# Patient Record
Sex: MALE | Race: WHITE | NOT HISPANIC OR LATINO | Employment: FULL TIME | URBAN - METROPOLITAN AREA
[De-identification: names, ages, dates, MRNs, and addresses within clinical notes are randomized per-mention and may not be internally consistent; named-entity substitution may affect disease eponyms.]

---

## 2017-07-31 ENCOUNTER — APPOINTMENT (OUTPATIENT)
Dept: OCCUPATIONAL MEDICINE | Facility: CLINIC | Age: 27
End: 2017-07-31

## 2017-07-31 PROCEDURE — 99000 SPECIMEN HANDLING OFFICE-LAB: CPT | Performed by: PHYSICIAN ASSISTANT

## 2022-06-28 ENCOUNTER — OFFICE VISIT (OUTPATIENT)
Dept: FAMILY MEDICINE | Facility: CLINIC | Age: 32
End: 2022-06-28
Payer: COMMERCIAL

## 2022-06-28 VITALS
OXYGEN SATURATION: 98 % | HEART RATE: 62 BPM | TEMPERATURE: 98.1 F | SYSTOLIC BLOOD PRESSURE: 134 MMHG | BODY MASS INDEX: 26.46 KG/M2 | DIASTOLIC BLOOD PRESSURE: 76 MMHG | RESPIRATION RATE: 12 BRPM | WEIGHT: 174.6 LBS | HEIGHT: 68 IN

## 2022-06-28 DIAGNOSIS — Z11.4 SCREENING FOR HIV (HUMAN IMMUNODEFICIENCY VIRUS): ICD-10-CM

## 2022-06-28 DIAGNOSIS — S06.0X1A CONCUSSION WITH LOSS OF CONSCIOUSNESS OF 30 MINUTES OR LESS, INITIAL ENCOUNTER: ICD-10-CM

## 2022-06-28 DIAGNOSIS — Z11.59 NEED FOR HEPATITIS C SCREENING TEST: ICD-10-CM

## 2022-06-28 DIAGNOSIS — G40.909 SEIZURE DISORDER (H): Primary | ICD-10-CM

## 2022-06-28 PROCEDURE — 99204 OFFICE O/P NEW MOD 45 MIN: CPT | Performed by: FAMILY MEDICINE

## 2022-06-28 RX ORDER — LEVETIRACETAM 500 MG/1
500 TABLET ORAL
COMMUNITY

## 2022-06-28 ASSESSMENT — PAIN SCALES - GENERAL: PAINLEVEL: MILD PAIN (3)

## 2022-06-28 NOTE — PROGRESS NOTES
Assessment & Plan     Seizure disorder (H)  No recurrence since restarting Keppra.  Advised long term management of seizures.  Patient will see neurology to establish care.  Advised no driving for at least 90 days due to recent recurrence of seizure.  Advised not to consume alcohol while on levetiracetam as he reported feeling easily drunk when he drank alcohol the first time he took Keppra last year.  Reasons to go to ER discussed in detail with patient.  - Adult Neurology  Referral    Screening for HIV (human immunodeficiency virus)  Patient will address on wellness visit.    Need for hepatitis C screening test  Patient will address on wellness visit.    Concussion with loss of consciousness of 30 minutes or less, initial encounter  No red flags but patient has persistent right sided headache and states has been reported to be forgetful.  Referred to concussion clinic for longer term surveillance.  - Concussion  Referral    Patient Instructions   Referrals to neurology and concussion clinic have been signed. Schedulers will call you in the next 3-5 business days.     Continue keppra as prescribed.    No driving until further advised by your neurologist.    Do not consume alcohol since you are taking levetiracetam.    Talk to your neurologist about getting the tetanus vaccine later this year.    You may have your hepatitis C and HIV screening completed on your wellness exam.          Return in about 1 month (around 7/28/2022) for In-clinic visit for wellness exam.    Jordan Herrera MD  [facility]    Tone Cole is a 31 year old, presenting for the following health issues:  Seizures (History of epilepsy, seizure on Friday, was seen at [facility].  Had been off of his meds and doing well. ) and Imm/Inj (Update tetanus)      HPI     Chief Complaint   Patient presents with     Seizures     History of epilepsy, seizure on Friday, was seen at [facility].   "Had been off of his meds and doing well.      Imm/Inj     Update tetanus       Has had seizure in October 2021. Was placed on levetiracetam.  Patient never had regular follow ups with neurology.  He stopped the med Jan 2022 due to not feeling good with it.    Patient said the medication made him feel easy to get drunk.      ED/UC Followup:    Facility:  [facility]  Date of visit: 6/24/22  Reason for visit: seizure  Current Status: Continues to have headache.  Slight confusion.  Restarted his medication on Friday, Keppra.  Right ear has been plugged since the seizure.    Patient still c/o right sided headache since the seizure and hitting his head in the process.   Denies confusion, or behavior changes.  May be a little more forgetful than his usual self.    Patient denies recurrence of seizure since 6/24/2022.    Review of Systems   Constitutional, HEENT, cardiovascular, pulmonary, GI, , musculoskeletal, neuro, skin, endocrine and psych systems are negative, except as otherwise noted.      Objective    /76 (BP Location: Left arm, Patient Position: Chair, Cuff Size: Adult Large)   Pulse 62   Temp 98.1  F (36.7  C) (Tympanic)   Resp 12   Ht 1.715 m (5' 7.5\")   Wt 79.2 kg (174 lb 9.6 oz)   SpO2 98%   BMI 26.94 kg/m    Body mass index is 26.94 kg/m .  Physical Exam   GENERAL: overweight, alert and no distress, ambulatory w/o assist; gait normal  EYES: PEBRTL, EOMI, no icterus  HENT: atraumatic; non-tender on palpation, EAM with impacted cerumen bilaterally, nose clear; throat clear  NECK: no tenderness, no adenopathy,  Thyroid not enlarged  RESP: lungs clear to auscultation - no rales, no rhonchi, no wheezes  CV: regular rates and rhythm, no murmur  MS: no edema; full range of motion x 4   SKIN: no suspicious lesions, no rashes  NEURO: Patient is A and O X 3; Cranial nerves 2-12 intact;  Strength 5/5 all extremities; DTR: ++ x 4; Sensory no gross deficit; no tremors No problems with motor " coordination      Office Visit on 08/28/2008   Component Date Value Ref Range Status     WBC 08/28/2008 6.7  4.0 - 11.0 10e9/L Final     RBC Count 08/28/2008 5.23  4.4 - 5.9 10e12/L Final     Hemoglobin 08/28/2008 15.0  13.3 - 17.7 g/dL Final     Hematocrit 08/28/2008 42.7  40.0 - 53.0 % Final     MCV 08/28/2008 82  78 - 100 fl Final     MCH 08/28/2008 28.7  26.5 - 33.0 pg Final     MCHC 08/28/2008 35.1  31.5 - 36.5 g/dL Final     RDW 08/28/2008 12.6  10.0 - 15.0 % Final     Platelet Count 08/28/2008 297  150 - 450 10e9/L Final     Diff Method 08/28/2008 Automated Method   Final     % Neutrophils 08/28/2008 63  40 - 75 % Final     % Lymphocytes 08/28/2008 27  20 - 48 % Final     % Monocytes 08/28/2008 6  0 - 12 % Final     % Eosinophils 08/28/2008 4  0 - 6 % Final     % Basophils 08/28/2008 0  0 - 2 % Final     Absolute Neutrophil 08/28/2008 4.2  1.6 - 8.3 10e9/L Final     Absolute Lymphocytes 08/28/2008 1.8  0.8 - 5.3 10e9/L Final     Absolute Monocytes 08/28/2008 0.4  0.0 - 1.3 10e9/L Final     Absolute Eosinophils 08/28/2008 0.3  0.0 - 0.7 10e9/L Final     Absolute Basophils 08/28/2008 0.0  0.0 - 0.2 10e9/L Final               .  ..

## 2022-06-28 NOTE — PATIENT INSTRUCTIONS
Referrals to neurology and concussion clinic have been signed. Schedulers will call you in the next 3-5 business days.     Continue keppra as prescribed.    No driving until further advised by your neurologist.    Do not consume alcohol since you are taking levetiracetam.    Talk to your neurologist about getting the tetanus vaccine later this year.    You may have your hepatitis C and HIV screening completed on your wellness exam.

## 2022-11-19 ENCOUNTER — HEALTH MAINTENANCE LETTER (OUTPATIENT)
Age: 32
End: 2022-11-19

## 2024-01-28 ENCOUNTER — HEALTH MAINTENANCE LETTER (OUTPATIENT)
Age: 34
End: 2024-01-28

## 2025-02-01 ENCOUNTER — HEALTH MAINTENANCE LETTER (OUTPATIENT)
Age: 35
End: 2025-02-01